# Patient Record
Sex: MALE | Race: WHITE | Employment: FULL TIME | URBAN - METROPOLITAN AREA
[De-identification: names, ages, dates, MRNs, and addresses within clinical notes are randomized per-mention and may not be internally consistent; named-entity substitution may affect disease eponyms.]

---

## 2020-02-17 ENCOUNTER — HOSPITAL ENCOUNTER (EMERGENCY)
Age: 75
Discharge: HOME | End: 2020-02-17
Attending: EMERGENCY MEDICINE
Payer: MEDICARE

## 2020-02-17 ENCOUNTER — APPOINTMENT (OUTPATIENT)
Dept: GENERAL RADIOLOGY | Age: 75
End: 2020-02-17
Payer: MEDICARE

## 2020-02-17 ENCOUNTER — APPOINTMENT (OUTPATIENT)
Dept: CT IMAGING | Age: 75
End: 2020-02-17
Payer: MEDICARE

## 2020-02-17 VITALS
WEIGHT: 180 LBS | BODY MASS INDEX: 28.25 KG/M2 | HEIGHT: 67 IN | DIASTOLIC BLOOD PRESSURE: 99 MMHG | TEMPERATURE: 97.9 F | HEART RATE: 90 BPM | RESPIRATION RATE: 16 BRPM | SYSTOLIC BLOOD PRESSURE: 149 MMHG | OXYGEN SATURATION: 97 %

## 2020-02-17 LAB
ALBUMIN SERPL-MCNC: <0.2 GM/DL (ref 3.4–5)
ALP BLD-CCNC: 61 IU/L (ref 40–129)
ALT SERPL-CCNC: 17 U/L (ref 10–40)
ANION GAP SERPL CALCULATED.3IONS-SCNC: 11 MMOL/L (ref 4–16)
APTT: 26.1 SECONDS (ref 25.1–37.1)
AST SERPL-CCNC: 20 IU/L (ref 15–37)
BASOPHILS ABSOLUTE: 0 K/CU MM
BASOPHILS RELATIVE PERCENT: 0.3 % (ref 0–1)
BILIRUB SERPL-MCNC: 0.4 MG/DL (ref 0–1)
BUN BLDV-MCNC: 23 MG/DL (ref 6–23)
CALCIUM SERPL-MCNC: 9.2 MG/DL (ref 8.3–10.6)
CHLORIDE BLD-SCNC: 104 MMOL/L (ref 99–110)
CO2: 25 MMOL/L (ref 21–32)
CREAT SERPL-MCNC: 1 MG/DL (ref 0.9–1.3)
DIFFERENTIAL TYPE: ABNORMAL
EOSINOPHILS ABSOLUTE: 0 K/CU MM
EOSINOPHILS RELATIVE PERCENT: 0.7 % (ref 0–3)
GFR AFRICAN AMERICAN: >60 ML/MIN/1.73M2
GFR NON-AFRICAN AMERICAN: >60 ML/MIN/1.73M2
GLUCOSE BLD-MCNC: 102 MG/DL (ref 70–99)
HCT VFR BLD CALC: 37.6 % (ref 42–52)
HEMOGLOBIN: 12.5 GM/DL (ref 13.5–18)
IMMATURE NEUTROPHIL %: 0.7 % (ref 0–0.43)
INR BLD: 1.02 INDEX
LYMPHOCYTES ABSOLUTE: 1.3 K/CU MM
LYMPHOCYTES RELATIVE PERCENT: 42.3 % (ref 24–44)
MCH RBC QN AUTO: 32.1 PG (ref 27–31)
MCHC RBC AUTO-ENTMCNC: 33.2 % (ref 32–36)
MCV RBC AUTO: 96.7 FL (ref 78–100)
MONOCYTES ABSOLUTE: 0.3 K/CU MM
MONOCYTES RELATIVE PERCENT: 11 % (ref 0–4)
PDW BLD-RTO: 13.9 % (ref 11.7–14.9)
PLATELET # BLD: 149 K/CU MM (ref 140–440)
PMV BLD AUTO: 11.4 FL (ref 7.5–11.1)
POTASSIUM SERPL-SCNC: 4 MMOL/L (ref 3.5–5.1)
PROTHROMBIN TIME: 13.3 SECONDS (ref 11.7–14.5)
RBC # BLD: 3.89 M/CU MM (ref 4.6–6.2)
SEGMENTED NEUTROPHILS ABSOLUTE COUNT: 1.4 K/CU MM
SEGMENTED NEUTROPHILS RELATIVE PERCENT: 45 % (ref 36–66)
SODIUM BLD-SCNC: 140 MMOL/L (ref 135–145)
TOTAL IMMATURE NEUTOROPHIL: 0.02 K/CU MM
TOTAL PROTEIN: 7.5 GM/DL (ref 6.4–8.2)
WBC # BLD: 3 K/CU MM (ref 4–10.5)

## 2020-02-17 PROCEDURE — 6360000004 HC RX CONTRAST MEDICATION: Performed by: EMERGENCY MEDICINE

## 2020-02-17 PROCEDURE — 85610 PROTHROMBIN TIME: CPT

## 2020-02-17 PROCEDURE — 2580000003 HC RX 258: Performed by: EMERGENCY MEDICINE

## 2020-02-17 PROCEDURE — 85730 THROMBOPLASTIN TIME PARTIAL: CPT

## 2020-02-17 PROCEDURE — 99285 EMERGENCY DEPT VISIT HI MDM: CPT

## 2020-02-17 PROCEDURE — 6370000000 HC RX 637 (ALT 250 FOR IP): Performed by: EMERGENCY MEDICINE

## 2020-02-17 PROCEDURE — 93005 ELECTROCARDIOGRAM TRACING: CPT | Performed by: EMERGENCY MEDICINE

## 2020-02-17 PROCEDURE — 71045 X-RAY EXAM CHEST 1 VIEW: CPT

## 2020-02-17 PROCEDURE — 80053 COMPREHEN METABOLIC PANEL: CPT

## 2020-02-17 PROCEDURE — 85025 COMPLETE CBC W/AUTO DIFF WBC: CPT

## 2020-02-17 PROCEDURE — 71275 CT ANGIOGRAPHY CHEST: CPT

## 2020-02-17 RX ORDER — TRAMADOL HYDROCHLORIDE 50 MG/1
50 TABLET ORAL EVERY 8 HOURS PRN
Qty: 6 TABLET | Refills: 0 | Status: SHIPPED | OUTPATIENT
Start: 2020-02-17 | End: 2020-02-20

## 2020-02-17 RX ORDER — DIAPER,BRIEF,INFANT-TODD,DISP
EACH MISCELLANEOUS ONCE
Status: COMPLETED | OUTPATIENT
Start: 2020-02-17 | End: 2020-02-17

## 2020-02-17 RX ORDER — SODIUM CHLORIDE 0.9 % (FLUSH) 0.9 %
10 SYRINGE (ML) INJECTION
Status: COMPLETED | OUTPATIENT
Start: 2020-02-17 | End: 2020-02-17

## 2020-02-17 RX ADMIN — SODIUM CHLORIDE, PRESERVATIVE FREE 10 ML: 5 INJECTION INTRAVENOUS at 19:04

## 2020-02-17 RX ADMIN — IOPAMIDOL 70 ML: 755 INJECTION, SOLUTION INTRAVENOUS at 19:03

## 2020-02-17 RX ADMIN — BACITRACIN ZINC 1 G: 500 OINTMENT TOPICAL at 20:19

## 2020-02-17 ASSESSMENT — PAIN SCALES - GENERAL: PAINLEVEL_OUTOF10: 8

## 2020-02-18 PROCEDURE — 93010 ELECTROCARDIOGRAM REPORT: CPT | Performed by: INTERNAL MEDICINE

## 2020-02-18 NOTE — ED PROVIDER NOTES
7:20 PM   Care of patient transferred from Dr. Valeria Magdaleno to me. Patient was working on a car, when a ball bearing he was loosening with a blow torch shot and hit him in the left chest.  Patient admits to left chest wall hematoma with ringing in his left ear. Will follow up on CTA chest and disposition patient appropriately. 8:12 PM  Reevaluation of patient he is resting comfortably in bed, states that his pain is well controlled. Denies any dizziness or lightheadedness. Does admit to ringing in his left ear. Right TM is within normal limits, however his left TM does show perforation, this is most likely related to barotrauma as patient states there was a loud bang with the ball bearing. CTA chest does not show any active extravasation. Patient is not tachycardic, blood pressure within acceptable limits, he is appropriate for outpatient follow-up at this time. He is placed in compressive dressing for his hematoma. I did discuss with patient that it may change color, and the coloring may travel down his chest and abdomen which is to be expected. Patient understands return to the ED if symptoms worsen, begins having dizziness, lightheadedness, worsening chest pain, shortness of breath, worsening pain in his left ear. He is provided with ENT follow-up for his left ear, and is to follow-up with his primary care provider in 24 hours for repeat examination of his chest wall hematoma. All questions were answered, and he is in agreement with plan of care.      [id number] [facility],   02/17/20 0879

## 2020-02-20 LAB
EKG ATRIAL RATE: 84 BPM
EKG DIAGNOSIS: NORMAL
EKG P AXIS: 33 DEGREES
EKG P-R INTERVAL: 136 MS
EKG Q-T INTERVAL: 386 MS
EKG QRS DURATION: 84 MS
EKG QTC CALCULATION (BAZETT): 456 MS
EKG R AXIS: -24 DEGREES
EKG T AXIS: 41 DEGREES
EKG VENTRICULAR RATE: 84 BPM

## 2020-02-22 NOTE — ED PROVIDER NOTES
TLS tenderness or step-off. Respiratory:  Lungs clear to auscultation bilaterally. Respirations nonlabored. Speaking clearly in full sentences. Chest wall as above. No crepitance. No respiratory distress. Neurological:  Alert and oriented times 3. No focal neuro deficits. Sensation intact to light touch to distal upper/lower extremities; 5/5 and symmetric  and dorsi/plantar flexion. Ambulatory with a steady gait.              I have reviewed and interpreted all of the currently available lab results from this visit (if applicable):  Results for orders placed or performed during the hospital encounter of 02/17/20   CBC auto diff   Result Value Ref Range    WBC 3.0 (L) 4.0 - 10.5 K/CU MM    RBC 3.89 (L) 4.6 - 6.2 M/CU MM    Hemoglobin 12.5 (L) 13.5 - 18.0 GM/DL    Hematocrit 37.6 (L) 42 - 52 %    MCV 96.7 78 - 100 FL    MCH 32.1 (H) 27 - 31 PG    MCHC 33.2 32.0 - 36.0 %    RDW 13.9 11.7 - 14.9 %    Platelets 844 507 - 317 K/CU MM    MPV 11.4 (H) 7.5 - 11.1 FL    Differential Type AUTOMATED DIFFERENTIAL     Segs Relative 45.0 36 - 66 %    Lymphocytes % 42.3 24 - 44 %    Monocytes % 11.0 (H) 0 - 4 %    Eosinophils % 0.7 0 - 3 %    Basophils % 0.3 0 - 1 %    Segs Absolute 1.4 K/CU MM    Lymphocytes Absolute 1.3 K/CU MM    Monocytes Absolute 0.3 K/CU MM    Eosinophils Absolute 0.0 K/CU MM    Basophils Absolute 0.0 K/CU MM    Immature Neutrophil % 0.7 (H) 0 - 0.43 %    Total Immature Neutrophil 0.02 K/CU MM   CMP   Result Value Ref Range    Sodium 140 135 - 145 MMOL/L    Potassium 4.0 3.5 - 5.1 MMOL/L    Chloride 104 99 - 110 mMol/L    CO2 25 21 - 32 MMOL/L    BUN 23 6 - 23 MG/DL    CREATININE 1.0 0.9 - 1.3 MG/DL    Glucose 102 (H) 70 - 99 MG/DL    Calcium 9.2 8.3 - 10.6 MG/DL    Alb <0.2 (L) 3.4 - 5.0 GM/DL    Total Protein 7.5 6.4 - 8.2 GM/DL    Total Bilirubin 0.4 0.0 - 1.0 MG/DL    ALT 17 10 - 40 U/L    AST 20 15 - 37 IU/L    Alkaline Phosphatase 61 40 - 129 IU/L    GFR Non- >60 >60 HISTORY: Reason for exam:->left upper chest wall injury Reason for Exam: left upper chest wall injury Acuity: Acute Type of Exam: Initial Mechanism of Injury: Patient stated that he was working on a car and was hit by a car part on the left side of his chest FINDINGS: Cardiac silhouette appears unremarkable. No focal consolidation, pleural effusion, or pneumothorax identified. Calcified granuloma projects over the left upper lobe. Elevation of the right hemidiaphragm. No acute osseous abnormality identified. 1. No acute cardiopulmonary process identified. Cta Chest W Contrast    Result Date: 2/17/2020  EXAMINATION: CTA OF THE CHEST 2/17/2020 7:03 pm TECHNIQUE: CTA of the chest was performed after the administration of intravenous contrast.  Multiplanar reformatted images are provided for review. MIP images are provided for review. Dose modulation, iterative reconstruction, and/or weight based adjustment of the mA/kV was utilized to reduce the radiation dose to as low as reasonably achievable. COMPARISON: Chest x-ray same day HISTORY: ORDERING SYSTEM PROVIDED HISTORY: left frontal chest wall injury, + hematoma TECHNOLOGIST PROVIDED HISTORY: Reason for exam:->left frontal chest wall injury, + hematoma Reason for Exam: left frontal chest wall injury, + hematoma Acuity: Acute Type of Exam: Subsequent/Follow-up Mechanism of Injury: Patient stated that he was working on a car and was hit by a car part on the left side of his chest. Relevant Medical/Surgical History: 70cc [id number] FINDINGS: Pulmonary Arteries: Pulmonary arteries are adequately opacified for evaluation. No evidence of intraluminal filling defect to suggest pulmonary embolism. Main pulmonary artery is normal in caliber. Mediastinum: Multiple shotty non pathologically enlarged mediastinal nodes are identified, likely reactive. The heart and pericardium demonstrate no acute abnormality. There is no acute abnormality of the thoracic aorta. Lungs/pleura: Evaluation of the lungs is mildly limited due to respiratory motion artifact. No focal consolidation, pleural effusion, or pneumothorax identified. Calcified granuloma noted within the left upper lobe. Upper Abdomen: Limited images of the upper abdomen demonstrate multiple simple exophytic right renal cyst measuring up to 5.6 cm. Calcified granulomata throughout the spleen. Hepatic cyst within the left lobe of the liver measuring 2.5 cm Soft Tissues/Bones: Subcutaneous stranding throughout the soft tissues of the left chest wall with heterogeneous d confluent d area measuring 3.6 x 3.7 x 3.0 cm most consistent with subcutaneous hematoma (image 26 series 4; image 127 series 602; image 37 series 601). No subcutaneous gas identified. No acute fracture is identified. 1. No acute cardiopulmonary process identified. 2. No pulmonary embolism. 3. Soft tissue stranding and complex heterogeneous confluent collection within the subcutaneous fat of the left chest wall measuring 3.6 x 3.7 x 3.0 cm. Findings most consistent with hematoma and surrounding soft tissue contusion in the setting of trauma. 4. No acute fracture identified. MDM:  Pt presents as above. Emergent conditions considered. Presentation prompted initial stat portable chest x-ray which was negative for acute process. Labs are overall nonemergent including normal coags and no clinically significant anemia. Platelets are normal.  CTA imaging of patient's chest obtained to rule out more insidious vascular injury. CTA imaging results were pending and signed out to overnight physician, Dr. Jadon Kwok. Clinical Impression:  1. Hematoma of left chest wall, initial encounter    2.  Perforation of left tympanic membrane      Disposition referral (if applicable):  Nat Nieves MD  [address]  [facility] [place] [phone]    Schedule an appointment as soon as possible for a visit in 2 days      [facility]